# Patient Record
Sex: MALE | URBAN - METROPOLITAN AREA
[De-identification: names, ages, dates, MRNs, and addresses within clinical notes are randomized per-mention and may not be internally consistent; named-entity substitution may affect disease eponyms.]

---

## 2022-08-01 ENCOUNTER — APPOINTMENT (RX ONLY)
Dept: URBAN - METROPOLITAN AREA CLINIC 43 | Facility: CLINIC | Age: 42
Setting detail: DERMATOLOGY
End: 2022-08-01

## 2022-08-01 DIAGNOSIS — L20.89 OTHER ATOPIC DERMATITIS: ICD-10-CM

## 2022-08-01 PROBLEM — L20.84 INTRINSIC (ALLERGIC) ECZEMA: Status: ACTIVE | Noted: 2022-08-01

## 2022-08-01 PROCEDURE — ? ADDITIONAL NOTES

## 2022-08-01 PROCEDURE — 99203 OFFICE O/P NEW LOW 30 MIN: CPT

## 2022-08-01 PROCEDURE — ? TREATMENT REGIMEN

## 2022-08-01 PROCEDURE — ? PRESCRIPTION

## 2022-08-01 PROCEDURE — ? COUNSELING

## 2022-08-01 RX ORDER — BETAMETHASONE DIPROPIONATE 0.5 MG/G
CREAM TOPICAL BID
Qty: 45 | Refills: 0 | Status: ERX | COMMUNITY
Start: 2022-08-01

## 2022-08-01 RX ADMIN — BETAMETHASONE DIPROPIONATE: 0.5 CREAM TOPICAL at 00:00

## 2022-08-01 ASSESSMENT — LOCATION SIMPLE DESCRIPTION DERM
LOCATION SIMPLE: RIGHT ANTERIOR NECK
LOCATION SIMPLE: POSTERIOR NECK
LOCATION SIMPLE: GROIN

## 2022-08-01 ASSESSMENT — LOCATION ZONE DERM
LOCATION ZONE: TRUNK
LOCATION ZONE: NECK

## 2022-08-01 ASSESSMENT — LOCATION DETAILED DESCRIPTION DERM
LOCATION DETAILED: RIGHT CLAVICULAR NECK
LOCATION DETAILED: RIGHT INFERIOR POSTERIOR NECK
LOCATION DETAILED: SUPRAPUBIC SKIN

## 2022-08-01 NOTE — HPI: [email or web address] (PATIENT REPORTED)
Where Is Your Eczema Located?: Posterior neck and buttocks
Additional Comments (Use Complete Sentences): Patient reports clearing rash previously with unspecified prescription strength topical steroid lotion within 10 days.

## 2022-08-01 NOTE — PROCEDURE: TREATMENT REGIMEN
Show Cerave Line: Yes
Start Regimen: Betamethasone diproprionate 0.05% cream-\\n-Neck twice a day for 2 weeks. Then stop. Moisturizer/aquaphor on top.\\n-Groin- twice a day for 1 week. Then stop. Moisturizer/aquaphor on top.
Treatment 1: Betamethasone Diproprionate cream
Action 2: Continue
Sig For Treatment 1 (If Needed): twice daily
Action 1: Start
Detail Level: Zone

## 2022-08-01 NOTE — PROCEDURE: ADDITIONAL NOTES
Detail Level: Simple
Additional Notes: Well demarcated eczematous plaque on posterior neck and lateral neck\\n\\nDiscussed etiology and treatment option of eczema\\nDiscussed r/b/se of topical steroids\\n\\nAdvised to avoid cologne \\nAdvised thick moisturizers and gentle products\\nEmphasized moisturizer after bathing\\n\\nTx regimen \\nBetamethasone diproprionate 0.05% cream twice a day x 2 weeks. Then stop. Repeat cycle as needed.\\n\\nF/u PRN
Render Risk Assessment In Note?: no

## 2025-04-09 ENCOUNTER — APPOINTMENT (OUTPATIENT)
Dept: URBAN - METROPOLITAN AREA CLINIC 43 | Facility: CLINIC | Age: 45
Setting detail: DERMATOLOGY
End: 2025-04-09

## 2025-04-09 DIAGNOSIS — L20.89 OTHER ATOPIC DERMATITIS: ICD-10-CM | Status: INADEQUATELY CONTROLLED

## 2025-04-09 PROCEDURE — ? PRESCRIPTION

## 2025-04-09 PROCEDURE — ? TREATMENT REGIMEN

## 2025-04-09 PROCEDURE — 99214 OFFICE O/P EST MOD 30 MIN: CPT

## 2025-04-09 PROCEDURE — ? ADDITIONAL NOTES

## 2025-04-09 PROCEDURE — ? COUNSELING

## 2025-04-09 RX ORDER — TRIAMCINOLONE ACETONIDE 1 MG/G
CREAM TOPICAL BID
Qty: 80 | Refills: 1 | Status: ERX | COMMUNITY
Start: 2025-04-09

## 2025-04-09 RX ADMIN — TRIAMCINOLONE ACETONIDE: 1 CREAM TOPICAL at 00:00

## 2025-04-09 ASSESSMENT — LOCATION SIMPLE DESCRIPTION DERM
LOCATION SIMPLE: RIGHT ANTERIOR NECK
LOCATION SIMPLE: GROIN
LOCATION SIMPLE: POSTERIOR NECK

## 2025-04-09 ASSESSMENT — LOCATION ZONE DERM
LOCATION ZONE: NECK
LOCATION ZONE: TRUNK

## 2025-04-09 ASSESSMENT — BSA RASH: BSA RASH: 10

## 2025-04-09 ASSESSMENT — LOCATION DETAILED DESCRIPTION DERM
LOCATION DETAILED: SUPRAPUBIC SKIN
LOCATION DETAILED: RIGHT CLAVICULAR NECK
LOCATION DETAILED: RIGHT INFERIOR POSTERIOR NECK

## 2025-04-09 NOTE — PROCEDURE: ADDITIONAL NOTES
Detail Level: Simple
Additional Notes: Eczema \\nLocated neck,upper extremities and elbows , groin and genital areas. \\nWas currently using Betamethasone diproprionate 0.05% cream prn. \\nDiscussed etiology and treatment option of eczema\\nDiscussed r/b/se of topical steroids\\nDiscussed changing Betamethasone diproprionate 0.05% cream to triamcinolone acetonide 0.1 % topical cream. \\n\\nAdvised thick moisturizers and gentle products\\nEmphasized moisturizer after bathing\\n\\nStart Tx regimen:\\n\\ntriamcinolone acetonide 0.1 % topical cream\\nSig: Apply to affected areas on extremities and groin areas twice daily for two weeks then switch to twice daily on weekends as needed for maintenance. Do not apply to face.\\n\\n.\\n\\nF/u PRN
Render Risk Assessment In Note?: no

## 2025-04-09 NOTE — PROCEDURE: TREATMENT REGIMEN
Show Cerave Line: Yes
Initiate Regimen: triamcinolone acetonide 0.1 % topical cream Bid\\nSig: Apply to affected areas on extremities and groin areas twice daily for two weeks then switch to twice daily on weekends as needed for maintenance. Do not apply to face.\\nPt can use as:\\n-Neck twice a day for 2 weeks. Then stop. Moisturizer/aquaphor on top.\\n-Groin- twice a day for 1 week. Then stop. Moisturizer/aquaphor on top.
Treatment 1: Betamethasone Diproprionate cream
Action 2: Continue
Sig For Treatment 1 (If Needed): twice daily
Action 1: Start
Detail Level: Zone